# Patient Record
Sex: FEMALE | Race: WHITE
[De-identification: names, ages, dates, MRNs, and addresses within clinical notes are randomized per-mention and may not be internally consistent; named-entity substitution may affect disease eponyms.]

---

## 2020-08-29 ENCOUNTER — HOSPITAL ENCOUNTER (INPATIENT)
Dept: HOSPITAL 47 - FBPOP | Age: 25
LOS: 2 days | Discharge: HOME | End: 2020-08-31
Attending: OBSTETRICS & GYNECOLOGY | Admitting: OBSTETRICS & GYNECOLOGY
Payer: COMMERCIAL

## 2020-08-29 DIAGNOSIS — O26.893: Primary | ICD-10-CM

## 2020-08-29 DIAGNOSIS — Z3A.38: ICD-10-CM

## 2020-08-29 DIAGNOSIS — Z28.3: ICD-10-CM

## 2020-08-29 DIAGNOSIS — Z82.49: ICD-10-CM

## 2020-08-29 LAB
BASOPHILS # BLD AUTO: 0 K/UL (ref 0–0.2)
BASOPHILS NFR BLD AUTO: 0 %
EOSINOPHIL # BLD AUTO: 0.1 K/UL (ref 0–0.7)
EOSINOPHIL NFR BLD AUTO: 1 %
ERYTHROCYTE [DISTWIDTH] IN BLOOD BY AUTOMATED COUNT: 3.79 M/UL (ref 3.8–5.4)
ERYTHROCYTE [DISTWIDTH] IN BLOOD: 13.3 % (ref 11.5–15.5)
HCT VFR BLD AUTO: 37.6 % (ref 34–46)
HGB BLD-MCNC: 12.8 GM/DL (ref 11.4–16)
LYMPHOCYTES # SPEC AUTO: 1.7 K/UL (ref 1–4.8)
LYMPHOCYTES NFR SPEC AUTO: 14 %
MCH RBC QN AUTO: 33.7 PG (ref 25–35)
MCHC RBC AUTO-ENTMCNC: 34 G/DL (ref 31–37)
MCV RBC AUTO: 99.1 FL (ref 80–100)
MONOCYTES # BLD AUTO: 0.7 K/UL (ref 0–1)
MONOCYTES NFR BLD AUTO: 6 %
NEUTROPHILS # BLD AUTO: 9.1 K/UL (ref 1.3–7.7)
NEUTROPHILS NFR BLD AUTO: 78 %
PLATELET # BLD AUTO: 236 K/UL (ref 150–450)
WBC # BLD AUTO: 11.7 K/UL (ref 3.8–10.6)

## 2020-08-29 PROCEDURE — 86850 RBC ANTIBODY SCREEN: CPT

## 2020-08-29 PROCEDURE — 86880 COOMBS TEST DIRECT: CPT

## 2020-08-29 PROCEDURE — 86870 RBC ANTIBODY IDENTIFICATION: CPT

## 2020-08-29 PROCEDURE — 59025 FETAL NON-STRESS TEST: CPT

## 2020-08-29 PROCEDURE — 86900 BLOOD TYPING SEROLOGIC ABO: CPT

## 2020-08-29 PROCEDURE — 99213 OFFICE O/P EST LOW 20 MIN: CPT

## 2020-08-29 PROCEDURE — 84112 EVAL AMNIOTIC FLUID PROTEIN: CPT

## 2020-08-29 PROCEDURE — 90707 MMR VACCINE SC: CPT

## 2020-08-29 PROCEDURE — 86901 BLOOD TYPING SEROLOGIC RH(D): CPT

## 2020-08-29 PROCEDURE — 85461 HEMOGLOBIN FETAL: CPT

## 2020-08-29 PROCEDURE — 85025 COMPLETE CBC W/AUTO DIFF WBC: CPT

## 2020-08-29 RX ADMIN — KETOROLAC TROMETHAMINE PRN MG: 15 INJECTION, SOLUTION INTRAMUSCULAR; INTRAVENOUS at 15:48

## 2020-08-29 RX ADMIN — POTASSIUM CHLORIDE SCH MLS/HR: 14.9 INJECTION, SOLUTION INTRAVENOUS at 14:06

## 2020-08-29 RX ADMIN — POTASSIUM CHLORIDE SCH MLS/HR: 14.9 INJECTION, SOLUTION INTRAVENOUS at 09:43

## 2020-08-29 RX ADMIN — DEXTROSE SCH MLS/HR: 50 INJECTION, SOLUTION INTRAVENOUS at 11:11

## 2020-08-29 RX ADMIN — DOCUSATE SODIUM AND SENNOSIDES SCH EACH: 50; 8.6 TABLET ORAL at 20:04

## 2020-08-29 RX ADMIN — POTASSIUM CHLORIDE SCH MLS/HR: 14.9 INJECTION, SOLUTION INTRAVENOUS at 07:05

## 2020-08-29 RX ADMIN — KETOROLAC TROMETHAMINE PRN MG: 15 INJECTION, SOLUTION INTRAMUSCULAR; INTRAVENOUS at 21:36

## 2020-08-29 RX ADMIN — DEXTROSE SCH: 50 INJECTION, SOLUTION INTRAVENOUS at 19:09

## 2020-08-29 NOTE — P.OP
Date of Procedure: 20


Preoperative Diagnosis: 


Arrestive descent and dilatation, suspect occiput posterior position


Spontaneous rupture of membranes


Rh-


Postoperative Diagnosis: 


Arrestive descent dilatation


Occiput posterior


Spontaneous rupture of membranes


Rh-


Procedure(s) Performed: 


Primary low transverse  section


Anesthesia: epidural


Surgeon: Florencia Owusu


Assistant #1: Jarod Miller


Estimated Blood Loss (ml): 500


IV fluids (ml): 300


Urine output (ml): 100


Pathology: none sent


Condition: stable


Disposition: floor


Indications for Procedure: 


This is a 25-year-old  1 para 0 woman who presented at 38-2/7 weeks' 

gestation having had spontaneous rupture of membranes at home.  On admission 

rupture of membranes was confirmed and she was irregularly ivonne.  She is 

1 cm dilated.  She was admitted and group B strep prophylactic antibiotics were 

initiated.  Pitocin augmentation was initiated.  She received Stadol analgesia 

and when she had progressed to 3 cm dilated she received an epidural anesthetic.

 Upon my initial evaluation the patient was 4 cm dilated however significant Was

noted.  Bedside ultrasound confirmed vertex presentation however infant was 

noted to be in the direct occiput posterior position.  She continued to labor 

for several more hours however the cervix only marginally dilated further.  

Patient had category 2 fetal heart tones with frequent early appearing fetal 

heart rate decelerations.  After several hours with no change the patient and 

her  were counseled regarding my concern for arrest of labor secondary to

occiput posterior position.  There are recommended primary low transverse 

 section and risks of the procedure were reviewed.  The patient and her 

 agreed to proceed.


Operative Findings: 


Female infant in the direct occiput posterior position with Apgars of 8 at 1 

minute and 9 at 5 minutes weighing 7 lbs. 1 oz., 3200 g.  Intact, three-vessel 

cord placenta.  Normal-appearing bilateral fallopian tubes and ovaries.


Description of Procedure: 


After the patient's epidural was bolused she was taken to the operating room 

where she was positioned, prepped and draped in the dorsal supine position with 

a leftward tilt.  Anesthetic was confirmed adequate a low transverse skin 

incision was made.  This was carried down to the underlying fascia sharply and 

with the electrocautery.  The fascia was incised in the midline and extended 

bilaterally with the Moeller scissors.  The inferior and superior aspects of the 

fascial incision were elevated and the underlying rectus muscles dissected off 

sharply.  The rectus muscles were then bluntly  in the midline and the 

peritoneum was tented up and entered sharply.  The peritoneal incision was 

extended inferiorly and superiorly with good visualization the bladder.  The 

bladder blade was placed.  The vesicouterine peritoneum was identified, tented 

up and entered sharply.  The bladder flap was created both sharply and 

digitally.  The bladder blade was replaced.  A low transverse uterine incision 

was made and carried down to the underlying amniotic membranes sharply.  

Membranes were ruptured and clear fluid was noted.  The uterine incision was 

extended bilaterally bluntly.  The infant's head was delivered from the direct 

occiput posterior position without delivery.  The nose and mouth were bulb 

suctioned and the rest the infant was delivered onto the field.  The cord was 

clamped and cut.  The infant was taken to the warmer for evaluation.  An intact,

three-vessel cord placenta was then manually removed.  The uterus was ex

teriorized and cleared of all clot and debris.  Uterine incision was delineated 

using Gaines clamps.  Incision was closed in a running locked fashion with 0

Vicryl suture followed by second imbricating layer of the same.  An additional 

figure-of-eight stitch was placed for hemostasis which was then achieved.  The 

uterus was returned to the abdomen and the gutters were cleared of all clot and 

debris.  Uterine incision was reinspected and noted to be hemostatic.  The 

rectus muscles, fascial edges and peritoneal edges were inspected and noted to 

be hemostatic.  The peritoneum was reapproximated in the midline.  The fascia 

was then closed in a running fashion with 0 Vicryl suture in halves.  The subcu 

tissue was then copiously suction irrigated and Bovie electrocautery was 

utilized were necessary for hemostasis.  The skin was reapproximated using 3-0 

chromic.  The skin was then closed in a subcutaneous fashion with 4-0 Vicryl 

suture.  All counts reported to me as correct by the operating room staff and 

she did receive Pitocin following delivery of placenta.  Antibiotics were given 

preoperatively.  Both mother and infant were transported back to her room for 

recovery.

## 2020-08-29 NOTE — P.HPOB
History of Present Illness


H&P Date: 20


Chief Complaint: Spontaneous rupture of membranes





This is a 25-year-old  1 para 0 woman with an estimated due date of 

09/10/2020 who presents at 38-2/7 weeks' gestation with spontaneous rupture of 

membranes at approximately 4 AM.  She reports having light vaginal bleeding and 

cramping followed by gush of fluids.  On presentation to labor and delivery 

triage rupture of membranes was confirmed.  She was noted to be 1 cm dilated and

ivonne every 7-10 minutes with a reactive NST.





Her pregnancy has been unremarkable.  She is known blood type O-.





Laboratory data: Group B strep positive, blood type A-, antibody screen 

negative, rubella nonimmune, VDRL nonreactive, hep Shabana surface antigen 

negative, HIV negative, gonorrhea and clinic cultures negative, throughout 

glucose tolerance testing within normal limits.  She received RhIg on 2020

















Review of Systems


All systems: negative





Past Medical History


Past Medical History: No Reported History


History of Any Multi-Drug Resistant Organisms: None Reported


Additional Past Surgical History / Comment(s): wisdom teeth removed


Past Anesthesia/Blood Transfusion Reactions: No Reported Reaction


Past Psychological History: No Psychological Hx Reported


Smoking Status: Never smoker


Past Alcohol Use History: None Reported


Past Drug Use History: None Reported





- Past Family History


  ** Father


Family Medical History: Hypertension





Medications and Allergies


                                Home Medications











 Medication  Instructions  Recorded  Confirmed  Type


 


Pnv No.95/Ferrous Fum/Folic AC 1 tab PO ONCE 20 History





[Prenatal Multivitamin Tablet]    








                                    Allergies











Allergy/AdvReac Type Severity Reaction Status Date / Time


 


No Known Allergies Allergy   Verified 20 06:05














Exam


                                   Vital Signs











  Temp Pulse Resp BP Pulse Ox


 


 20 07:41  97.4 F L  68  18  125/70  97


 


 20 06:41  98.0 F  98  16  132/81  97








                                Intake and Output











 20





 22:59 06:59 14:59


 


Other:   


 


  Weight  95.254 kg 95.254 kg














Upon my initial evaluation patient is resting comfortably with an epidural 

anesthetic.   female who is visibly gravid.  Targeted physical exam is 

performed.  Cervix is 4 cm dilated, 50% effaced with vertex in the -3 station.  

There is significant amount of It noted.  Bedside ultrasound does confirm vertex

presentation on however occiput posterior.  Fetal heart tones are category 1 and

she is ivonne every 1-2 minutes with Pitocin augmentation.





Results


Result Diagrams: 


                                 20 07:03





                  Abnormal Lab Results - Last 24 Hours (Table)











  20 Range/Units





  07:03 


 


WBC  11.7 H  (3.8-10.6)  k/uL


 


RBC  3.79 L  (3.80-5.40)  m/uL


 


Neutrophils #  9.1 H  (1.3-7.7)  k/uL














Assessment and Plan


(1) 38 weeks gestation of pregnancy


Current Visit: Yes   Status: Acute   Code(s): Z3A.38 - 38 WEEKS GESTATION OF 

PREGNANCY   SNOMED Code(s): 57624903


   





(2) Spontaneous rupture of membranes


Current Visit: Yes   Status: Acute   Code(s): WAP7368 -    SNOMED Code(s): 

746222135


   





(3) Rh negative, maternal


Current Visit: Yes   Status: Acute   Code(s): O26.899 - OTH PREGNANCY RELATED 

CONDITIONS, UNSPECIFIED TRIMESTER; Z67.91 - UNSPECIFIED BLOOD TYPE, RH NEGATIVE 

 SNOMED Code(s): 914827249


   





(4) Rubella non-immune status, antepartum


Current Visit: Yes   Status: Acute   Code(s): O99.89 - OTH DISEASES AND C

ONDITIONS COMPL PREG/CHLDBRTH; Z28.3 - UNDERIMMUNIZATION STATUS   SNOMED 

Code(s): 497403042


   


Plan: 





25-year-old  1 para 0 woman with spontaneous rupture of membranes at 38-

2/7 weeks' gestation.  She is receiving Pitocin augmentation of labor and group 

B strep prophylactic antibiotics.  Of note on bedside ultrasound performed for 

findings of significant At the infant appears to be in the occiput posterior 

position.  Reviewed this finding with the the patient and her .  We will 

proceed with Pitocin as well as position changes to help facilitate rotation of 

the infant.  We discussed that should the labor not proceed along the a

nticipated labor curve this may be secondary to position of the infant that 

sometimes result in necessitating delivery by  section.  All questions 

were answered and fetal status is currently reassuring.

## 2020-08-30 LAB
BASOPHILS # BLD AUTO: 0 K/UL (ref 0–0.2)
BASOPHILS NFR BLD AUTO: 0 %
EOSINOPHIL # BLD AUTO: 0 K/UL (ref 0–0.7)
EOSINOPHIL NFR BLD AUTO: 0 %
ERYTHROCYTE [DISTWIDTH] IN BLOOD BY AUTOMATED COUNT: 3.13 M/UL (ref 3.8–5.4)
ERYTHROCYTE [DISTWIDTH] IN BLOOD: 13.4 % (ref 11.5–15.5)
HCT VFR BLD AUTO: 31.6 % (ref 34–46)
HGB BLD-MCNC: 10.5 GM/DL (ref 11.4–16)
LYMPHOCYTES # SPEC AUTO: 1.4 K/UL (ref 1–4.8)
LYMPHOCYTES NFR SPEC AUTO: 10 %
MCH RBC QN AUTO: 33.3 PG (ref 25–35)
MCHC RBC AUTO-ENTMCNC: 33.1 G/DL (ref 31–37)
MCV RBC AUTO: 100.8 FL (ref 80–100)
MONOCYTES # BLD AUTO: 0.6 K/UL (ref 0–1)
MONOCYTES NFR BLD AUTO: 5 %
NEUTROPHILS # BLD AUTO: 11.6 K/UL (ref 1.3–7.7)
NEUTROPHILS NFR BLD AUTO: 84 %
PLATELET # BLD AUTO: 175 K/UL (ref 150–450)
WBC # BLD AUTO: 13.8 K/UL (ref 3.8–10.6)

## 2020-08-30 RX ADMIN — IBUPROFEN PRN MG: 600 TABLET ORAL at 18:19

## 2020-08-30 RX ADMIN — IBUPROFEN PRN MG: 600 TABLET ORAL at 23:41

## 2020-08-30 RX ADMIN — KETOROLAC TROMETHAMINE PRN MG: 15 INJECTION, SOLUTION INTRAMUSCULAR; INTRAVENOUS at 12:19

## 2020-08-30 RX ADMIN — DOCUSATE SODIUM AND SENNOSIDES SCH EACH: 50; 8.6 TABLET ORAL at 08:28

## 2020-08-30 RX ADMIN — KETOROLAC TROMETHAMINE PRN MG: 15 INJECTION, SOLUTION INTRAMUSCULAR; INTRAVENOUS at 03:14

## 2020-08-30 NOTE — P.PN
Progress Note - Text


Date: 2020 Time: 1608


The patient is status post  section


Vital signs stable


VAS: 0-10


Patient has no complaints of pain.  The patient incurred some minimal itching 

yesterday, this itching is  now subsiding.


Pain meds to be managed by service.

## 2020-08-30 NOTE — P.PNOBGPC
Subjective





- Subjective


Principal diagnosis: Postop day 1 status post primary  section


Interval history: 





She is feeling very exhausted this morning but pain well controlled.


Patient reports: Reports appetite normal, Reports voiding normally, Reports pain

well controlled, Reports ambulating normally, Denies dizzy ambulation, Denies 

nauseated


: doing well





Objective





- Vital Signs


Latest vital signs: 


                                   Vital Signs











  Temp Pulse Resp BP Pulse Ox


 


 20 08:00  98.4 F  82  16  154/87 


 


 20 05:35    16  


 


 20 04:00  98.2 F  71  16  132/83  97


 


 20 02:00    16  


 


 20 00:00  96.8 F L  68  14  131/82  97


 


 20 22:00    16  


 


 20 20:00  98.7 F  73  16  130/79  98


 


 20 18:44    16  


 


 20 16:54  97.9 F  97  17  145/84  95


 


 20 16:44    17   95


 


 20 16:21   80  16  138/77  95


 


 20 15:54  98.6 F  76  16  138/83  97


 


 20 15:44    16   97


 


 20 15:39   70  18  130/78  98


 


 20 15:24   75  16  136/76  98


 


 20 15:09   79  17  137/81  97


 


 20 14:54  97.7 F  82  17  129/73  95








                                Intake and Output











 20





 22:59 06:59 14:59


 


Output Total 600 1100 400


 


Balance -600 -1100 -400


 


Output:   


 


  Urine 600 1100 400


 


    Uretheral ([place]zquita)  400 


 


Other:   


 


  # Voids   1














- Exam


Lungs: bilateral: normal


Extremities: Present: normal, tenderness, edema


Abdomen: Present: normal appearance, soft, tenderness.  Absent: distention


Incision: Present: normal, dry, intact, dressed


Uterus: Present: normal, firm





- Labs


Labs: 


                  Abnormal Lab Results - Last 24 Hours (Table)











  20 Range/Units





  05:28 


 


WBC  13.8 H  (3.8-10.6)  k/uL


 


RBC  3.13 L  (3.80-5.40)  m/uL


 


Hgb  10.5 L  (11.4-16.0)  gm/dL


 


Hct  31.6 L  (34.0-46.0)  %


 


MCV  100.8 H  (80.0-100.0)  fL


 


Neutrophils #  11.6 H  (1.3-7.7)  k/uL














Assessment and Plan


(1) 38 weeks gestation of pregnancy


Current Visit: Yes   Status: Acute   Code(s): Z3A.38 - 38 WEEKS GESTATION OF 

PREGNANCY   SNOMED Code(s): 50977306


   





(2) Spontaneous rupture of membranes


Current Visit: Yes   Status: Acute   Code(s): DDR5579 -    SNOMED Code(s): 

173148127


   





(3) Rh negative, maternal


Current Visit: Yes   Status: Acute   Code(s): O26.899 - OTH PREGNANCY RELATED 

CONDITIONS, UNSPECIFIED TRIMESTER; Z67.91 - UNSPECIFIED BLOOD TYPE, RH NEGATIVE 

 SNOMED Code(s): 007421153


   





(4) Rubella non-immune status, antepartum


Current Visit: Yes   Status: Acute   Code(s): O99.89 - OTH DISEASES AND 

CONDITIONS COMPL PREG/CHLDBRTH; Z28.3 - UNDERIMMUNIZATION STATUS   SNOMED 

Code(s): 981272172


   





(5) Occiput posterior presentation of fetus


Current Visit: Yes   Status: Acute   Code(s): O64.0XX0 - OBSTRUCTED LABOR DUE TO

INCMPL ROTATION OF FETAL HEAD, UNSP   SNOMED Code(s): 43213191


   





(6) Failure to progress in first stage of labor


Current Visit: Yes   Status: Acute   Code(s): MZN9424 -    SNOMED Code(s): 

884468065


   


Plan: 





Postop day 1 status post primary low transverse  section for persistent 

occiput posterior position and arrest of descent and dilatation in labor.  She 

is recovering well.  Events of  reviewed with her and all questions 

answered.  Routine postoperative care.

## 2020-08-31 VITALS — RESPIRATION RATE: 18 BRPM

## 2020-08-31 VITALS — HEART RATE: 78 BPM | SYSTOLIC BLOOD PRESSURE: 135 MMHG | DIASTOLIC BLOOD PRESSURE: 74 MMHG | TEMPERATURE: 98.9 F

## 2020-08-31 RX ADMIN — DOCUSATE SODIUM AND SENNOSIDES SCH: 50; 8.6 TABLET ORAL at 10:24

## 2020-08-31 RX ADMIN — DEXTROSE SCH: 50 INJECTION, SOLUTION INTRAVENOUS at 10:36

## 2020-08-31 RX ADMIN — IBUPROFEN PRN MG: 600 TABLET ORAL at 05:55

## 2020-08-31 RX ADMIN — HYDROCODONE BITARTRATE AND ACETAMINOPHEN PRN EACH: 5; 325 TABLET ORAL at 12:55

## 2020-08-31 RX ADMIN — HYDROCODONE BITARTRATE AND ACETAMINOPHEN PRN EACH: 5; 325 TABLET ORAL at 02:48

## 2020-08-31 RX ADMIN — DOCUSATE SODIUM AND SENNOSIDES SCH: 50; 8.6 TABLET ORAL at 00:23

## 2020-08-31 NOTE — P.DS
Providers


Date of admission: 


20 06:43





Expected date of discharge: 20


Attending physician: 


Kourtney Peres





Primary care physician: 


Stated None








- Discharge Diagnosis(es)


(1) 38 weeks gestation of pregnancy


Current Visit: Yes   Status: Acute   





(2) Spontaneous rupture of membranes


Current Visit: Yes   Status: Acute   





(3) Rh negative, maternal


Current Visit: Yes   Status: Acute   





(4) Rubella non-immune status, antepartum


Current Visit: Yes   Status: Acute   





(5) Occiput posterior presentation of fetus


Current Visit: Yes   Status: Acute   





(6) Failure to progress in first stage of labor


Current Visit: Yes   Status: Acute   


Hospital Course: 





This is a 25-year-old  1 now para 1 woman who presented with spontaneous 

rupture of membranes in early active labor.  Please see the history and physical

for complete details.  Early in her labor there was significant Formation and 

bedside ultrasound confirmed vertex presentation however direct occiput anterior

position.  She did labor with Pitocin augmentation as well as group B strep 

prophylactic antibiotics.  She did not progress past 4 cm dilated and persistent

occiput posterior position was suspected.  She was counseled regarding options 

after several hours and did choose to proceed with primary low transverse 

 section.  Findings at the time of surgery were significant for a 

liveborn infant in the direct occiput posterior position with Apgars of 8 at 1 

minute and 9 at 5 minutes weighing 3200 g.  Please see the operative report for 

details.  The patient's postoperative course was unremarkable.  By postoperative

day number voiding without difficulty and tolerating a general diet.  Her 

postoperative day 1 hemoglobin was within normal limits.  By postoperative day 

#2 she continued to do well.  Her incision appeared well healing and her lochia 

was decreasing.  She was breast-feeding successfully.  Her pain was controlled 

with oral pain medications.  She was therefore discharged home on postoperative 

day #2 with routine instructions for care and follow-up.


Procedures: 





Primary low transverse  section


Patient Condition at Discharge: Good





Plan - Discharge Summary


Discharge Rx Participant: Yes


New Discharge Prescriptions: 


New


   HYDROcodone/APAP 5-325MG [Norco 5-325] 1 each PO Q4HR PRN #18 tab


     PRN Reason: Moderate Pain





No Action


   Pnv No.95/Ferrous Fum/Folic AC [Prenatal Multivitamin Tablet] 1 tab PO ONCE


Discharge Medication List





Pnv No.95/Ferrous Fum/Folic AC [Prenatal Multivitamin Tablet] 1 tab PO ONCE 

20 [History]


HYDROcodone/APAP 5-325MG [[place] 5-325] 1 each PO Q4HR PRN #18 tab 20 [Rx]








Follow up Appointment(s)/Referral(s): 


Kourtney Peres MD [STAFF PHYSICIAN] - 2 Weeks


Activity/Diet/Wound Care/Special Instructions: 


Follow-up in 2 weeks after surgery in the office.  Call the office with any 

concerning signs or symptoms including fever greater than 101, severe abdominal

pain, heavy vaginal bleeding, signs of wound infection, increased swelling or 

redness of the lower extremities, signs of postpartum depression.  No driving 

for 2 weeks after surgery.  No heavy lifting or vigorous activity until 

reevaluated in the office.  No intercourse for 6 weeks after delivery.


Discharge Disposition: HOME SELF-CARE

## 2020-09-01 NOTE — P.MSEPDOC
Presenting Problems





- Arrival Data


Date of Arrival on Unit: 20


Time of Arrival on Unit: 06:41


Mode of Transport: Ambulatory





- Complaint


OB-Reason for Admission/Chief Complaint: Rule Out SROM





Prenatal Medical History





- Pregnancy Information


: 1


Para: 0


Term: 0


: 0


Abortions: Spontaneous or Elective: 0


Number of Living Children: 0





- Gestational Age


Gestational Age by STACEY (wks/days): 38 Weeks and 2 Days





- Prenatal History


Pregnancy Complications: GBS+





Review of Systems





- Review of Systems


Constitutional: No problems


Breast: No problems


ENT: No problems


Cardiovascular: No problems


Respiratory: No problems


Gastrointestinal: No problems


Genitourinary: No problems


Musculoskeletal: No problems


Neurological: No problems


Skin: No problems





Vital Signs





- Temperature


Temperature: 98.9 F


Temperature Source: Oral





- Pulse


  ** Right Brachial


Pulse Rate: 78


Pulse Assessment Method: Pulse Oximetry





- Respirations


Respiratory Rate: 18


Oxygen Delivery Method: Room Air


O2 Sat by Pulse Oximetry: 96





- Blood Pressure


  ** Right Arm


Blood Pressure: 135/74


Blood Pressure Mean: 94


Blood Pressure Source: Automatic Cuff





Medical Screen Scoring (Pre)





- Cervical Exam


Dilation: 1-3 cm = 1


Membranes: Ruptured = 3





- Uterine Contractions


Frequency: > 5 minutes apart = 1


Duration: > 40 seconds = 2


Intensity: N/A





- Maternal Vital Signs


Maternal Temperature: N/A


Maternal Blood Pressure: N/A


Signs of Preeclampsia: N/A


Maternal Respirations: N/A





- Maternal Trauma


Maternal Trauma: N/A





- Fetal Assessment - Baby A


Baseline FHR: 130


Fetal Heart Rate - NICHD Category: Category I (Normal) = 0


NST: Reactive


Fetal Position: N/A


Fetal Station: N/A





- Total Score - Baby A


Total Score - Baby A: 7





- Total Score - Baby B


Total Score - Baby B: 7





- Total Score - Baby C


Total Score - Baby C: 7





- Level of Risk - Baby A


Level of Risk - Baby A: Medium (6-9)





- Level of Risk - Baby B


Level of Risk - Baby B: Medium (6-9)





- Level of Risk - Baby C


Level of Risk - Baby C: Medium (6-9)





Physician Notification (Pre)





- Physician Notified


Physician Notified Date: 20


Physician Notified Time: 06:41


New Order Received: Yes





- Notification Comment


Comment: Dr. Hurtubise given report on pt. Pt c/o. SROM of clear fluid. 

Positive.  amnisure. Vag exam of 1.5/60/-3. Contractions q6-7 minutes. Reactive 

NST. GBS POS. VS.  WNL. Order recieved to admit pt. To administer Pen G for GBS 

POS. To administer.  oxytocin. Pt may have stadol 1mg q2hrs PRN for labor pain.





Disposition





- Disposition


OB Disposition: Admit, LDRP Suite


Discharge Date: 20


Discharge Time: 17:00


I agree with the RN Medical Screening Exam: Yes


Risk & Benefit of care provided described in d/c instruction: Yes


Diagnosis: LOUSE-BORNE TYPHUS

## 2023-03-28 ENCOUNTER — HOSPITAL ENCOUNTER (OUTPATIENT)
Dept: HOSPITAL 47 - ORWHC2ENDO | Age: 28
Discharge: HOME | End: 2023-03-28
Attending: INTERNAL MEDICINE
Payer: COMMERCIAL

## 2023-03-28 VITALS — SYSTOLIC BLOOD PRESSURE: 122 MMHG | HEART RATE: 81 BPM | DIASTOLIC BLOOD PRESSURE: 77 MMHG | RESPIRATION RATE: 14 BRPM

## 2023-03-28 VITALS — BODY MASS INDEX: 29.9 KG/M2

## 2023-03-28 VITALS — TEMPERATURE: 97.3 F

## 2023-03-28 DIAGNOSIS — Z79.899: ICD-10-CM

## 2023-03-28 DIAGNOSIS — Z98.891: ICD-10-CM

## 2023-03-28 DIAGNOSIS — K29.50: Primary | ICD-10-CM

## 2023-03-28 PROCEDURE — 88305 TISSUE EXAM BY PATHOLOGIST: CPT

## 2023-03-28 PROCEDURE — 81025 URINE PREGNANCY TEST: CPT

## 2023-03-28 PROCEDURE — 45378 DIAGNOSTIC COLONOSCOPY: CPT

## 2023-03-28 PROCEDURE — 43239 EGD BIOPSY SINGLE/MULTIPLE: CPT

## 2023-03-28 RX ADMIN — POTASSIUM CHLORIDE SCH MLS: 14.9 INJECTION, SOLUTION INTRAVENOUS at 07:25

## 2023-03-28 RX ADMIN — POTASSIUM CHLORIDE SCH MLS: 14.9 INJECTION, SOLUTION INTRAVENOUS at 07:53

## 2023-03-28 NOTE — P.PCN
Date of Procedure: 03/28/23


Procedure(s) Performed: 


Brief history:


Patient is a pleasant 27-year-old white female scheduled for an elective upper 

endoscopy as well as colonoscopy as a part of evaluation of lower abdominal 

pain, epigastric pain and alternating diarrhea and constipation for the last 5 

years duration





Procedure performed:


Esophagogastroduodenoscopy with biopsy


Colonoscopy





Preoperative diagnosis:


Abdominal pain


Change in bowel habits





Anesthesia: MAC





Procedure:


After informed consent was obtained from the patient  was brought into the 

endoscopy unit and IV  sedation was administered by anesthesia under continuous 

monitoring.  Initially upper endoscopy was done.  The Olympus  video 

endoscope was inserted inserted into the mouth and esophagus intubated without 

any difficulty and was gradually advanced into the stomach and duodenum and 

carefully examined.  The bulb and second part of the duodenum appeared normal. 

biopsies were done from the duodenum to rule out celiac disease. The scope was 

then withdrawn into the stomach adequately insufflated with air and upon careful

examination  the antrum had mild gastritis and biopsies were done from this 

area.  Mucosa of the body, cardia and fundus appeared normal.  The scope was 

then withdrawn into the esophagus.  The GE junction was located at 40 cm to the 

incisors.  It appeared regular with no erythema erosions or ulcerations.  Rest 

of the esophagus appeared normal.  Patient tolerated the procedure well.





At this time the patient continued to remain sedation.  Initial digital rectal 

examination was normal.  Olympus  video colonoscope was then inserted into

the rectum and gradually advanced to the cecum without any difficulty.  Careful 

examination was performed as the scope was gradually being withdrawn.  The prep 

was excellent. terminal ileum was intubated and 20 cm visualized and appeared 

normal. The cecum, ascending colon, transverse colon, descending colon, sigmoid 

colon and rectum appeared normal.  Retroflexion was performed in the rectum and 

no lesions were noted.  Patient tolerated the procedure well.





Impression:


1. Upper endoscopy revealed mild antral gastritis but no evidence of esophagitis

or peptic ulcer disease


2. Colonoscopy was within normal limits with no evidence of colitis or 

colorectal neoplasia








Recommendations:


Findings of this examination were discussed with the patient as well as her 

family.  She was advised to follow with the biopsy results. Recommend a high-

fiber diet and fiber supplements a regular basis